# Patient Record
Sex: FEMALE | Race: WHITE | Employment: UNEMPLOYED | URBAN - METROPOLITAN AREA
[De-identification: names, ages, dates, MRNs, and addresses within clinical notes are randomized per-mention and may not be internally consistent; named-entity substitution may affect disease eponyms.]

---

## 2017-01-25 ENCOUNTER — HOSPITAL ENCOUNTER (OUTPATIENT)
Dept: ULTRASOUND IMAGING | Age: 22
Discharge: HOME | End: 2017-01-25
Attending: OBSTETRICS & GYNECOLOGY
Payer: MEDICAID

## 2017-01-25 DIAGNOSIS — Z34.82 PRENATAL CARE, SUBSEQUENT PREGNANCY, SECOND TRIMESTER: ICD-10-CM

## 2017-01-25 PROCEDURE — 76811 OB US DETAILED SNGL FETUS: CPT

## 2021-04-19 ENCOUNTER — APPOINTMENT (OUTPATIENT)
Dept: ULTRASOUND IMAGING | Facility: HOSPITAL | Age: 26
End: 2021-04-19
Attending: EMERGENCY MEDICINE
Payer: MEDICAID

## 2021-04-19 ENCOUNTER — HOSPITAL ENCOUNTER (EMERGENCY)
Facility: HOSPITAL | Age: 26
Discharge: HOME | End: 2021-04-19
Attending: EMERGENCY MEDICINE
Payer: MEDICAID

## 2021-04-19 VITALS
BODY MASS INDEX: 43.4 KG/M2 | RESPIRATION RATE: 16 BRPM | TEMPERATURE: 99 F | OXYGEN SATURATION: 98 % | WEIGHT: 293 LBS | SYSTOLIC BLOOD PRESSURE: 155 MMHG | HEIGHT: 69 IN | DIASTOLIC BLOOD PRESSURE: 85 MMHG | HEART RATE: 76 BPM

## 2021-04-19 DIAGNOSIS — O20.0 THREATENED MISCARRIAGE IN EARLY PREGNANCY: Primary | ICD-10-CM

## 2021-04-19 PROCEDURE — 93975 VASCULAR STUDY: CPT | Performed by: EMERGENCY MEDICINE

## 2021-04-19 PROCEDURE — 96361 HYDRATE IV INFUSION ADD-ON: CPT

## 2021-04-19 PROCEDURE — 86900 BLOOD TYPING SEROLOGIC ABO: CPT

## 2021-04-19 PROCEDURE — 86901 BLOOD TYPING SEROLOGIC RH(D): CPT

## 2021-04-19 PROCEDURE — 86900 BLOOD TYPING SEROLOGIC ABO: CPT | Performed by: EMERGENCY MEDICINE

## 2021-04-19 PROCEDURE — 76817 TRANSVAGINAL US OBSTETRIC: CPT | Performed by: EMERGENCY MEDICINE

## 2021-04-19 PROCEDURE — 84702 CHORIONIC GONADOTROPIN TEST: CPT

## 2021-04-19 PROCEDURE — 85025 COMPLETE CBC W/AUTO DIFF WBC: CPT | Performed by: EMERGENCY MEDICINE

## 2021-04-19 PROCEDURE — 99284 EMERGENCY DEPT VISIT MOD MDM: CPT

## 2021-04-19 PROCEDURE — 76801 OB US < 14 WKS SINGLE FETUS: CPT | Performed by: EMERGENCY MEDICINE

## 2021-04-19 PROCEDURE — 86901 BLOOD TYPING SEROLOGIC RH(D): CPT | Performed by: EMERGENCY MEDICINE

## 2021-04-19 PROCEDURE — 96360 HYDRATION IV INFUSION INIT: CPT

## 2021-04-19 PROCEDURE — 85025 COMPLETE CBC W/AUTO DIFF WBC: CPT

## 2021-04-19 PROCEDURE — 84702 CHORIONIC GONADOTROPIN TEST: CPT | Performed by: EMERGENCY MEDICINE

## 2021-04-19 NOTE — ED PROVIDER NOTES
Patient Seen in: [facility] Emergency Department      History   Patient presents with:  Eval-RODRIGO    Stated Complaint: Vaginal bleeding onset 2300- 10 wks pregnant. -  A0 L2    HPI/Subjective:   HPI    19-year-old female complaining of vaginal ble closed there is no cervical motion tenderness masses or adnexal tenderness or masses. ED Course     Labs Reviewed   HCG, BETA SUBUNIT (QUANT PREGNANCY TEST) - Abnormal; Notable for the following components:       Result Value    Hcg Quantitative 22,904.

## 2021-07-08 LAB
AMB EXT CHLAMYDIA, NUCLEIC ACID AMP: NEGATIVE
AMB EXT GONOCOCCUS, NUCLEIC ACID AMP: NEGATIVE
AMB EXT THINPREP PAP: NEGATIVE

## 2021-07-12 LAB
AMB EXT ANTIBODY SCREEN: NEGATIVE
AMB EXT CYSFIB RESULT: NEGATIVE
AMB EXT GLUCOSE 1HR OB: 246
AMB EXT HBSAG SCREEN: NEGATIVE
AMB EXT HEMATOCRIT: 37.5
AMB EXT HEMOGLOBIN: 12.6
AMB EXT MCV: 89
AMB EXT PLATELETS: 263
AMB EXT RH FACTOR: POSITIVE

## 2021-07-21 ENCOUNTER — TELEPHONE (OUTPATIENT)
Dept: OBGYN CLINIC | Facility: CLINIC | Age: 26
End: 2021-07-21

## 2021-07-21 NOTE — TELEPHONE ENCOUNTER
31 pages of records received from [facility].   Pt has an appointment with Dr. Jasmine Price as transfer OB 7/29 at 5pm.

## 2021-07-22 LAB
HEMOGLOBIN A1C: 5.7
TSH: 2.88 UIU/ML

## 2021-07-22 NOTE — PROGRESS NOTES
07/12/2021 Single, live intrauterine pregnancy in Breech presentation,  Presentaion: Cephalic Placenta: Posterior Grade 0, GA: 21w 4d,  FHT: 155, Cardiac Activity: Regular Rhythm, Amniotic Fluid Volume: Normal.

## 2021-07-29 ENCOUNTER — INITIAL PRENATAL (OUTPATIENT)
Dept: OBGYN CLINIC | Facility: CLINIC | Age: 26
End: 2021-07-29
Payer: MEDICAID

## 2021-07-29 VITALS
DIASTOLIC BLOOD PRESSURE: 70 MMHG | WEIGHT: 293 LBS | HEIGHT: 69 IN | BODY MASS INDEX: 43.4 KG/M2 | HEART RATE: 95 BPM | SYSTOLIC BLOOD PRESSURE: 120 MMHG

## 2021-07-29 DIAGNOSIS — O24.419 GESTATIONAL DIABETES MELLITUS (GDM) AFFECTING THIRD PREGNANCY: ICD-10-CM

## 2021-07-29 DIAGNOSIS — Z34.82 PRENATAL CARE, SUBSEQUENT PREGNANCY IN SECOND TRIMESTER: Primary | ICD-10-CM

## 2021-07-29 DIAGNOSIS — O99.322 DRUG USE AFFECTING PREGNANCY IN SECOND TRIMESTER: ICD-10-CM

## 2021-07-29 LAB
AMPHET UR QL SCN: NEGATIVE
APPEARANCE: CLEAR
BENZODIAZ UR QL SCN: NEGATIVE
BILIRUBIN: NEGATIVE
COCAINE UR QL: NEGATIVE
CREAT UR-SCNC: 107 MG/DL
GLUCOSE (URINE DIPSTICK): >=1000 MG/DL
LEUKOCYTES: NEGATIVE
MDMA UR QL SCN: NEGATIVE
MULTISTIX LOT#: ABNORMAL NUMERIC
NITRITE, URINE: NEGATIVE
OCCULT BLOOD: NEGATIVE
OPIATES UR QL SCN: NEGATIVE
OXYCODONE UR QL SCN: NEGATIVE
PH, URINE: 5.5 (ref 4.5–8)
PROTEIN (URINE DIPSTICK): NEGATIVE MG/DL
SPECIFIC GRAVITY: >=1.03 (ref 1–1.03)
URINE-COLOR: YELLOW
UROBILINOGEN,SEMI-QN: 0.2 MG/DL (ref 0–1.9)

## 2021-07-29 PROCEDURE — 3078F DIAST BP <80 MM HG: CPT | Performed by: OBSTETRICS & GYNECOLOGY

## 2021-07-29 PROCEDURE — 3008F BODY MASS INDEX DOCD: CPT | Performed by: OBSTETRICS & GYNECOLOGY

## 2021-07-29 PROCEDURE — 3074F SYST BP LT 130 MM HG: CPT | Performed by: OBSTETRICS & GYNECOLOGY

## 2021-07-29 PROCEDURE — 80307 DRUG TEST PRSMV CHEM ANLYZR: CPT | Performed by: OBSTETRICS & GYNECOLOGY

## 2021-07-29 PROCEDURE — 80349 CANNABINOIDS NATURAL: CPT | Performed by: OBSTETRICS & GYNECOLOGY

## 2021-07-29 PROCEDURE — 81002 URINALYSIS NONAUTO W/O SCOPE: CPT | Performed by: OBSTETRICS & GYNECOLOGY

## 2021-07-29 PROCEDURE — 0500F INITIAL PRENATAL CARE VISIT: CPT | Performed by: OBSTETRICS & GYNECOLOGY

## 2021-07-29 NOTE — PROGRESS NOTES
Was recently diagnosed with a UTI is on the antibiotic now. She had gestational diabetes with her 2 other pregnancies and was diet controlled. She was prescribed a glucometer but insurance did not cover it we will see dietitian again.   She desires to hav

## 2021-07-30 NOTE — PROGRESS NOTES
Pt aware of results and recommendations to discontinue use of cannabis due to effects on fetal brain development. Understanding verbalized. Per pt, she had already informed provider of cannabis use.  Pt advised it is standard procedure for us to call on pos

## 2021-08-01 ENCOUNTER — TELEPHONE (OUTPATIENT)
Dept: OBGYN CLINIC | Facility: CLINIC | Age: 26
End: 2021-08-01

## 2021-08-01 DIAGNOSIS — Z36.89 ENCOUNTER FOR FETAL ANATOMIC SURVEY: Primary | ICD-10-CM

## 2021-08-01 LAB — DRUG CONFIRMATION,CANNABINOIDS: 47 NG/ML

## 2021-08-01 NOTE — TELEPHONE ENCOUNTER
----- Message from Nova Sanchez sent at 7/29/2021  6:01 PM CDT -----  Regarding: pa needed for us on 08- with [facility]  Jania Montes pt needs pa for her us on 08- with [facility].  Thanks

## 2021-08-01 NOTE — TELEPHONE ENCOUNTER
Sunday, August 01, 2021 5:08 PM  Summary of Your Request  Please review the details of your request below and if everything looks correct click CONTINUE    Your case has been Approved.      Provider Name: DR. Lacey Aguilar Contact: Hebert Lopez  Provider Address: [address]

## 2021-08-11 ENCOUNTER — ROUTINE PRENATAL (OUTPATIENT)
Dept: OBGYN CLINIC | Facility: CLINIC | Age: 26
End: 2021-08-11
Payer: MEDICAID

## 2021-08-11 ENCOUNTER — DIABETIC EDUCATION (OUTPATIENT)
Dept: ENDOCRINOLOGY CLINIC | Facility: CLINIC | Age: 26
End: 2021-08-11
Payer: MEDICAID

## 2021-08-11 VITALS — BODY MASS INDEX: 47 KG/M2 | WEIGHT: 293 LBS

## 2021-08-11 VITALS
WEIGHT: 293 LBS | SYSTOLIC BLOOD PRESSURE: 100 MMHG | HEIGHT: 69 IN | HEART RATE: 118 BPM | BODY MASS INDEX: 43.4 KG/M2 | DIASTOLIC BLOOD PRESSURE: 70 MMHG

## 2021-08-11 DIAGNOSIS — O24.419 GESTATIONAL DIABETES MELLITUS (GDM) IN SECOND TRIMESTER, GESTATIONAL DIABETES METHOD OF CONTROL UNSPECIFIED: Primary | ICD-10-CM

## 2021-08-11 DIAGNOSIS — Z34.82 PRENATAL CARE, SUBSEQUENT PREGNANCY, SECOND TRIMESTER: ICD-10-CM

## 2021-08-11 DIAGNOSIS — O10.019 PRE-EXISTING ESSENTIAL HYPERTENSION DURING PREGNANCY, ANTEPARTUM: ICD-10-CM

## 2021-08-11 DIAGNOSIS — O13.9 TRANSIENT HYPERTENSION OF PREGNANCY, ANTEPARTUM: ICD-10-CM

## 2021-08-11 DIAGNOSIS — O24.410 DIET CONTROLLED GESTATIONAL DIABETES MELLITUS (GDM) IN THIRD TRIMESTER: Primary | ICD-10-CM

## 2021-08-11 LAB
GLUCOSE (URINE DIPSTICK): NEGATIVE MG/DL
MULTISTIX LOT#: NORMAL NUMERIC
PROTEIN (URINE DIPSTICK): NEGATIVE MG/DL

## 2021-08-11 PROCEDURE — 99211 OFF/OP EST MAY X REQ PHY/QHP: CPT

## 2021-08-11 PROCEDURE — 81002 URINALYSIS NONAUTO W/O SCOPE: CPT | Performed by: STUDENT IN AN ORGANIZED HEALTH CARE EDUCATION/TRAINING PROGRAM

## 2021-08-11 PROCEDURE — 0502F SUBSEQUENT PRENATAL CARE: CPT | Performed by: STUDENT IN AN ORGANIZED HEALTH CARE EDUCATION/TRAINING PROGRAM

## 2021-08-11 PROCEDURE — 3008F BODY MASS INDEX DOCD: CPT | Performed by: STUDENT IN AN ORGANIZED HEALTH CARE EDUCATION/TRAINING PROGRAM

## 2021-08-11 PROCEDURE — 3074F SYST BP LT 130 MM HG: CPT | Performed by: STUDENT IN AN ORGANIZED HEALTH CARE EDUCATION/TRAINING PROGRAM

## 2021-08-11 PROCEDURE — 3078F DIAST BP <80 MM HG: CPT | Performed by: STUDENT IN AN ORGANIZED HEALTH CARE EDUCATION/TRAINING PROGRAM

## 2021-08-11 RX ORDER — ASPIRIN 81 MG/1
81 TABLET, CHEWABLE ORAL DAILY
Qty: 30 TABLET | Refills: 5 | Status: SHIPPED | OUTPATIENT
Start: 2021-08-11

## 2021-08-11 RX ORDER — ACETAMINOPHEN 500 MG
500 TABLET ORAL EVERY 6 HOURS PRN
COMMUNITY

## 2021-08-11 NOTE — PROGRESS NOTES
Deepthileti Yeimi   1995 was seen for Gestational Diabetes Counseling: Individual 25 weeks   Start time at 3:30 pm finish time: 4:30     : 3  Para: 2  EDC: 2021     GDM Screen: failed gtt tolerance oral  History of GDM: Yes     Jyoti involvement/social support encouraged. Identification of lifestyle behaviors willing to change discussed. Training Tools Provided:  [facility] [facility] and Pregnancy: A guide to self management  BG Log Sheets    Recommendations:      1.  Follow rec

## 2021-08-11 NOTE — PROGRESS NOTES
VIELKA - 25w6d    Denies regular CTX, VB, LOF. +FM    32year old  EDC by ED US  not c/w LMP  Transfer of care from [facility]  1) Morbid obesity, BMI 47  -serial growth US  2) Chronic HTN based on multiple mild range Bps documented in 1st tri (pt report

## 2021-08-12 ENCOUNTER — TELEPHONE (OUTPATIENT)
Dept: OBGYN CLINIC | Facility: CLINIC | Age: 26
End: 2021-08-12

## 2021-08-12 ENCOUNTER — ULTRASOUND ENCOUNTER (OUTPATIENT)
Dept: OBGYN CLINIC | Facility: CLINIC | Age: 26
End: 2021-08-12
Payer: MEDICAID

## 2021-08-12 DIAGNOSIS — O24.419 GESTATIONAL DIABETES MELLITUS (GDM) IN SECOND TRIMESTER, GESTATIONAL DIABETES METHOD OF CONTROL UNSPECIFIED: ICD-10-CM

## 2021-08-12 LAB
AMB EXT CMP ALT: 15 U/L
AMB EXT CMP AST: 21 U/L
AMB EXT CREATININE: 0.52 MG/DL
AMB EXT GFR: 132
AMB EXT GLUCOSE: 129 MG/DL
AMB EXT HEMATOCRIT: 36.8
AMB EXT HEMATOCRIT: 36.8
AMB EXT HEMOGLOBIN: 12.4
AMB EXT HEMOGLOBIN: 12.4
AMB EXT MCV: 90
AMB EXT MCV: 90
AMB EXT PLATELETS: 253
AMB EXT PLATELETS: 253
AMB EXT POSTASSIUM: 4.2 MMOL/L
AMB EXT SODIUM: 138 MMOL/L
AMB EXT WBC: 11.3 X10(3)UL

## 2021-08-12 RX ORDER — BLOOD-GLUCOSE METER
1 EACH MISCELLANEOUS 2 TIMES DAILY
Qty: 1 KIT | Refills: 0 | Status: SHIPPED | OUTPATIENT
Start: 2021-08-12

## 2021-08-12 RX ORDER — BLOOD SUGAR DIAGNOSTIC
1 STRIP MISCELLANEOUS 4 TIMES DAILY
Qty: 150 STRIP | Refills: 3 | Status: SHIPPED | OUTPATIENT
Start: 2021-08-12 | End: 2021-12-10

## 2021-08-12 RX ORDER — LANCETS 30 GAUGE
4 EACH MISCELLANEOUS 4 TIMES DAILY
Qty: 100 EACH | Refills: 3 | Status: SHIPPED | OUTPATIENT
Start: 2021-08-12 | End: 2021-12-10

## 2021-08-12 NOTE — TELEPHONE ENCOUNTER
Diabetic test kit order was sent to [facility] they called patient and stated insurance wont cover test strips so she has to go to [facility]. Pharmacy changed.   Please advise patient when re-sent to [facility]

## 2021-08-12 NOTE — TELEPHONE ENCOUNTER
PA placed for MFM  our case has been sent to [facility].      Provider Name: DR. Ralph Garza: [facility]  Provider Address: [address], [address] [place] [address] Phone Number: [phone]   Fax Number: [phone]  Patient Name: Cyndi Alejandre

## 2021-08-13 ENCOUNTER — TELEPHONE (OUTPATIENT)
Dept: OBGYN CLINIC | Facility: CLINIC | Age: 26
End: 2021-08-13

## 2021-08-13 DIAGNOSIS — O12.10 PROTEINURIA AFFECTING PREGNANCY, ANTEPARTUM: Primary | ICD-10-CM

## 2021-08-13 LAB
CREATININE: 108.4 MG/DL
PROTEIN,TOTAL,URINE: 36.4 MG/DL
PROTEIN/CREATININE RATIO: 336
URIC ACID: 4.3

## 2021-08-16 NOTE — TELEPHONE ENCOUNTER
Results reviewed  Urine protein creatinine ratio elevated (0.34), may be due to chronic hypertension  Recommend doing 24h urine protein collection, ordered now

## 2021-08-16 NOTE — TELEPHONE ENCOUNTER
Pt advised of AM recommendations for 24 hr urine. Understanding verbalized.    Orders faxed to [facility] per pt request.

## 2021-08-17 ENCOUNTER — TELEPHONE (OUTPATIENT)
Dept: OBGYN CLINIC | Facility: CLINIC | Age: 26
End: 2021-08-17

## 2021-08-17 NOTE — TELEPHONE ENCOUNTER
Isaiah Browning (Key: [id number])    Your information has been submitted to [employer]. [employer] is reviewing the PA request and you will receive an electronic response.  You may check for the updated outcome later by reopening this request. The standard fax

## 2021-08-17 NOTE — TELEPHONE ENCOUNTER
Pt just called to let us know that she did not get the entire order prescribed, she needs the strips. Please advise.  Thanks

## 2021-08-17 NOTE — TELEPHONE ENCOUNTER
Pt aware rx, PA was submitted, rx was already in system and awaiting payor authorization. Understanding verbalized. Pt advised to contact insurance to expedite processing of PA. Understanding verbalized.        Per pt request, provider to be notified she

## 2021-08-20 NOTE — TELEPHONE ENCOUNTER
Spoke with patient and she is aware we sent a appeal for MFM and are awaiting results. Understanding verbalized.

## 2021-08-20 NOTE — TELEPHONE ENCOUNTER
Original request denied. Appeal submitted. Authorization Lookup  Authorization Number: [id number]  Case Number: [id number]     Status:  Additional Information Received, Pending Medical Director Review  P2P Status:   Approval Date:   Service Code: [place]

## 2021-08-24 ENCOUNTER — TELEPHONE (OUTPATIENT)
Dept: ENDOCRINOLOGY CLINIC | Facility: CLINIC | Age: 26
End: 2021-08-24

## 2021-08-24 NOTE — TELEPHONE ENCOUNTER
ROBINSON to [facility] to [facility] appt[facility] tomorrow. Due to a malfunctioning air conditioning system, your visit tomorrow at 11am needs to be changed to a virtual visit or reschedule for another day .

## 2021-08-25 NOTE — TELEPHONE ENCOUNTER
Pt aware authorization received for level 2 ultrasound. Per pt request sent information for MFM via [employer] message. Pt advised to call and schedule. Understanding verbalized.

## 2021-08-25 NOTE — TELEPHONE ENCOUNTER
Authorization Lookup  Authorization Number: [id number]  Case Number: [id number]     Status: Approved  P2P Status:   Approval Date: 8/23/2021 12:00:00 AM  Service Code: [id number]  Service Description: OB US, DETAILED, [address]  Site Name: Erica Rollins 0 = understands/communicates without difficulty

## 2021-08-30 ENCOUNTER — TELEPHONE (OUTPATIENT)
Dept: ENDOCRINOLOGY CLINIC | Facility: CLINIC | Age: 26
End: 2021-08-30

## 2021-08-31 ENCOUNTER — TELEPHONE (OUTPATIENT)
Dept: OBGYN CLINIC | Facility: CLINIC | Age: 26
End: 2021-08-31

## 2021-08-31 NOTE — TELEPHONE ENCOUNTER
Order for Maternity Lumbar Support and Electric Breast Pump was signed by Dr. Maritza Villa and faxed back to [facility].

## 2021-09-03 PROBLEM — O10.919 CHRONIC HYPERTENSION AFFECTING PREGNANCY: Status: ACTIVE | Noted: 2021-09-03

## 2021-09-03 PROBLEM — E66.01 MATERNAL MORBID OBESITY IN THIRD TRIMESTER, ANTEPARTUM (HCC): Status: ACTIVE | Noted: 2021-09-03

## 2021-09-03 PROBLEM — O99.213 MATERNAL MORBID OBESITY IN THIRD TRIMESTER, ANTEPARTUM (HCC): Status: ACTIVE | Noted: 2021-09-03

## 2021-09-03 PROBLEM — Z34.83 PRENATAL CARE, SUBSEQUENT PREGNANCY IN THIRD TRIMESTER: Status: ACTIVE | Noted: 2021-09-03

## 2021-09-03 PROBLEM — R03.0 PREHYPERTENSION: Status: ACTIVE | Noted: 2021-02-01

## 2021-09-03 PROBLEM — E66.01 MORBID OBESITY WITH BMI OF 45.0-49.9, ADULT (HCC): Status: ACTIVE | Noted: 2021-01-01

## 2021-09-03 PROBLEM — O24.410 DIET CONTROLLED GESTATIONAL DIABETES MELLITUS (GDM) IN THIRD TRIMESTER: Status: ACTIVE | Noted: 2021-09-03

## 2021-09-03 PROBLEM — O99.323 DRUG USE AFFECTING PREGNANCY IN THIRD TRIMESTER: Status: ACTIVE | Noted: 2021-07-29

## 2021-09-03 PROBLEM — Z34.93 PREGNANCY WITH PRENATAL CARE ELSEWHERE IN THIRD TRIMESTER: Status: ACTIVE | Noted: 2021-09-03

## 2021-09-03 PROBLEM — O09.293 H/O MACROSOMIA IN INFANT IN PRIOR PREGNANCY, CURRENTLY PREGNANT, THIRD TRIMESTER: Status: ACTIVE | Noted: 2021-09-03

## 2021-09-03 PROBLEM — F12.90 MARIJUANA USE: Status: ACTIVE | Noted: 2021-01-01

## 2021-09-08 ENCOUNTER — TELEPHONE (OUTPATIENT)
Dept: OBGYN CLINIC | Facility: CLINIC | Age: 26
End: 2021-09-08

## 2021-09-08 NOTE — TELEPHONE ENCOUNTER
Called pt and lmtcbtrs, she missed her appt on 09- at 11:00 with dr LAMBERT. Pt to call back the office to rs. Per protocol, pls advise and call pt to make sure she knows we need to see her.  Thanks

## 2021-10-06 ENCOUNTER — TELEPHONE (OUTPATIENT)
Dept: OBGYN CLINIC | Facility: CLINIC | Age: 26
End: 2021-10-06